# Patient Record
Sex: FEMALE | Race: ASIAN | NOT HISPANIC OR LATINO
[De-identification: names, ages, dates, MRNs, and addresses within clinical notes are randomized per-mention and may not be internally consistent; named-entity substitution may affect disease eponyms.]

---

## 2018-09-04 ENCOUNTER — APPOINTMENT (OUTPATIENT)
Dept: OPHTHALMOLOGY | Facility: CLINIC | Age: 43
End: 2018-09-04
Payer: SELF-PAY

## 2018-09-04 DIAGNOSIS — H40.003 PREGLAUCOMA, UNSPECIFIED, BILATERAL: ICD-10-CM

## 2018-09-04 PROBLEM — Z00.00 ENCOUNTER FOR PREVENTIVE HEALTH EXAMINATION: Status: ACTIVE | Noted: 2018-09-04

## 2018-09-04 PROCEDURE — 99201 OFFICE OUTPATIENT NEW 10 MINUTES: CPT

## 2018-09-04 PROCEDURE — 92133 CPTRZD OPH DX IMG PST SGM ON: CPT

## 2018-09-04 PROCEDURE — 76514 ECHO EXAM OF EYE THICKNESS: CPT

## 2019-03-04 ENCOUNTER — OUTPATIENT (OUTPATIENT)
Dept: OUTPATIENT SERVICES | Facility: HOSPITAL | Age: 44
LOS: 1 days | End: 2019-03-04
Payer: MEDICAID

## 2019-03-04 ENCOUNTER — APPOINTMENT (OUTPATIENT)
Dept: INTERVENTIONAL RADIOLOGY/VASCULAR | Facility: HOSPITAL | Age: 44
End: 2019-03-04

## 2019-03-04 DIAGNOSIS — D25.9 LEIOMYOMA OF UTERUS, UNSPECIFIED: ICD-10-CM

## 2019-03-04 DIAGNOSIS — Z01.818 ENCOUNTER FOR OTHER PREPROCEDURAL EXAMINATION: ICD-10-CM

## 2019-03-04 DIAGNOSIS — Z78.9 OTHER SPECIFIED HEALTH STATUS: ICD-10-CM

## 2019-03-04 DIAGNOSIS — F17.200 NICOTINE DEPENDENCE, UNSPECIFIED, UNCOMPLICATED: ICD-10-CM

## 2019-03-04 LAB
ANION GAP SERPL CALC-SCNC: 10 MMOL/L — SIGNIFICANT CHANGE UP (ref 5–17)
APTT BLD: 27.8 SEC — SIGNIFICANT CHANGE UP (ref 27.5–36.3)
BUN SERPL-MCNC: 15 MG/DL — SIGNIFICANT CHANGE UP (ref 7–23)
CALCIUM SERPL-MCNC: 9.3 MG/DL — SIGNIFICANT CHANGE UP (ref 8.4–10.5)
CHLORIDE SERPL-SCNC: 106 MMOL/L — SIGNIFICANT CHANGE UP (ref 96–108)
CO2 SERPL-SCNC: 23 MMOL/L — SIGNIFICANT CHANGE UP (ref 22–31)
CREAT SERPL-MCNC: 0.7 MG/DL — SIGNIFICANT CHANGE UP (ref 0.5–1.3)
GLUCOSE SERPL-MCNC: 104 MG/DL — HIGH (ref 70–99)
HCG SERPL-ACNC: <.1 MIU/ML — SIGNIFICANT CHANGE UP
HCT VFR BLD CALC: 45.5 % — HIGH (ref 34.5–45)
HGB BLD-MCNC: 14.6 G/DL — SIGNIFICANT CHANGE UP (ref 11.5–15.5)
INR BLD: 0.87 — LOW (ref 0.88–1.16)
MCHC RBC-ENTMCNC: 32.1 GM/DL — SIGNIFICANT CHANGE UP (ref 32–36)
MCHC RBC-ENTMCNC: 33 PG — SIGNIFICANT CHANGE UP (ref 27–34)
MCV RBC AUTO: 102.9 FL — HIGH (ref 80–100)
NRBC # BLD: 0 /100 WBCS — SIGNIFICANT CHANGE UP (ref 0–0)
PLATELET # BLD AUTO: 194 K/UL — SIGNIFICANT CHANGE UP (ref 150–400)
POTASSIUM SERPL-MCNC: 4.8 MMOL/L — SIGNIFICANT CHANGE UP (ref 3.5–5.3)
POTASSIUM SERPL-SCNC: 4.8 MMOL/L — SIGNIFICANT CHANGE UP (ref 3.5–5.3)
PROTHROM AB SERPL-ACNC: 9.8 SEC — LOW (ref 10–12.9)
RBC # BLD: 4.42 M/UL — SIGNIFICANT CHANGE UP (ref 3.8–5.2)
RBC # FLD: 13.6 % — SIGNIFICANT CHANGE UP (ref 10.3–14.5)
SODIUM SERPL-SCNC: 139 MMOL/L — SIGNIFICANT CHANGE UP (ref 135–145)
WBC # BLD: 7.12 K/UL — SIGNIFICANT CHANGE UP (ref 3.8–10.5)
WBC # FLD AUTO: 7.12 K/UL — SIGNIFICANT CHANGE UP (ref 3.8–10.5)

## 2019-03-04 PROCEDURE — 85610 PROTHROMBIN TIME: CPT

## 2019-03-04 PROCEDURE — 80048 BASIC METABOLIC PNL TOTAL CA: CPT

## 2019-03-04 PROCEDURE — 85027 COMPLETE CBC AUTOMATED: CPT

## 2019-03-04 PROCEDURE — 84702 CHORIONIC GONADOTROPIN TEST: CPT

## 2019-03-04 PROCEDURE — 85730 THROMBOPLASTIN TIME PARTIAL: CPT

## 2019-03-04 PROCEDURE — 99204 OFFICE O/P NEW MOD 45 MIN: CPT

## 2019-03-06 VITALS — HEART RATE: 64 BPM | DIASTOLIC BLOOD PRESSURE: 70 MMHG | SYSTOLIC BLOOD PRESSURE: 110 MMHG | RESPIRATION RATE: 14 BRPM

## 2019-03-06 PROBLEM — F17.200 CURRENT SMOKER: Status: ACTIVE | Noted: 2019-03-06

## 2019-03-06 PROBLEM — Z78.9 ALCOHOL USE: Status: ACTIVE | Noted: 2019-03-06

## 2019-03-06 PROBLEM — D25.9 FIBROID UTERUS: Status: ACTIVE | Noted: 2019-03-06

## 2019-03-06 NOTE — HISTORY OF PRESENT ILLNESS
[Dysmenorrhea] : dysmenorrhea [Menorrhagia] : ~T menorrhagia [Pelvic Pain] : pelvic pain [Urinary Frequency] : urinary frequency [Pressure] : pressure [Bloating] : bloating [Myomectomy] : myomectomy [Hysteroscopy] : hysteroscopy [Monthly Cycles Regular] : monthly cycles are regular [G ___] : G [unfilled] [P___] : P [unfilled] [Stable] : stable [FreeTextEntry1] : 43 year old first diagnosed with fibroids in 2008. Menorrhagia primary complaint at the time.  Underwent myomectomy in [place] hysteroscopically.  Additional fibroids have grown since and menorrhagia returned lasting 6 days, 2 heavy with clots, no metrorrhagia.  Also complains of cramps, dyspareunia, urinary frequency , nocturia, pelvic pressure and abdominal distention.  \par Pap negative.\par Periods are regular.\par \par MRI at outside institution January 2019 uterus measures 12.6x10.2x13.4 cm with multiple fibroids the largest measuring 6x4x5.  Ms Henderson has been asked to bring a CD of the MRI to my office for my review.\par No desire for future preganancy [Bleeding In Between Periods] : no bleeding in between periods [Lesions/ Discharge] : no lesions and or discharge [Menopausal Symptoms] : no complaints of menopausal symptoms [Post Menopause] : not post menopausal [Endometriosis] : no endometriosis [PID] : no PID [de-identified] : performed at outside institution.  awaiting images

## 2019-03-06 NOTE — ASSESSMENT
[FreeTextEntry1] : 43 year old with symptomatic uterine fibroids.  Awaiting CD of MRI to determine if patient is candidate for uterine fibroid embolization

## 2019-03-06 NOTE — CONSULT LETTER
[Dear  ___] : Dear  [unfilled], [Consult Letter:] : I had the pleasure of evaluating your patient, [unfilled]. [Please see my note below.] : Please see my note below. [Consult Closing:] : Thank you very much for allowing me to participate in the care of this patient.  If you have any questions, please do not hesitate to contact me. [Sincerely,] : Sincerely, [FreeTextEntry1] : She appears to be a candidate for UAE but awaiting CD of her outside January pelvic MRI. [FreeTextEntry3] : Juan Ramon Gil MD, FSIR\par Chief Interventional Radiology\par [facility]\par [facility]\par

## 2019-03-06 NOTE — PHYSICAL EXAM
[Alert] : alert [No Acute Distress] : no acute distress [Well Nourished] : well nourished [Well Developed] : well developed [Normal Sclera/Conjunctiva] : normal sclera/conjunctiva [EOMI] : extra occular movement intact [No Proptosis] : no proptosis [Normal Oropharynx] : the oropharynx was normal [No Neck Mass] : no neck mass was observed [Supple] : the neck was supple [No Respiratory Distress] : no respiratory distress [No Accessory Muscle Use] : no accessory muscle use [Clear to Auscultation] : lungs were clear to auscultation bilaterally [Normal Rate] : heart rate was normal  [Normal S1, S2] : normal S1 and S2 [Regular Rhythm] : with a regular rhythm [Pedal Pulses Normal] : the pedal pulses are present [No Edema] : there was no peripheral edema [Normal Bowel Sounds] : normal bowel sounds [Not Tender] : non-tender [Soft] : abdomen soft [Normal Post Cervical Nodes] : posterior cervical nodes [Normal Anterior Cervical Nodes] : anterior cervical nodes [Normal Axillary Nodes] : axillary nodes [No CVA Tenderness] : no ~M costovertebral angle tenderness [No Spinal Tenderness] : no spinal tenderness [Spine Straight] : spine straight [Normal Gait] : normal gait [Normal Strength/Tone] : muscle strength and tone were normal [No Rash] : no rash [No Tremors] : no tremors [Oriented x3] : oriented to person, place, and time [Normal Affect] : the affect was normal [Normal Mood] : the mood was normal [Fully active, able to carry on all pre-disease performance without restriction] : Fully active, able to carry on all pre-disease performance without restriction [Acanthosis Nigricans___] : no acanthosis nigricans [de-identified] : Uterus palpable to just below umbilicus

## 2019-03-28 ENCOUNTER — APPOINTMENT (OUTPATIENT)
Dept: INTERVENTIONAL RADIOLOGY/VASCULAR | Facility: HOSPITAL | Age: 44
End: 2019-03-28

## 2019-04-07 ENCOUNTER — FORM ENCOUNTER (OUTPATIENT)
Age: 44
End: 2019-04-07

## 2019-04-08 ENCOUNTER — APPOINTMENT (OUTPATIENT)
Dept: INTERVENTIONAL RADIOLOGY/VASCULAR | Facility: HOSPITAL | Age: 44
End: 2019-04-08
Payer: COMMERCIAL

## 2019-04-08 ENCOUNTER — OUTPATIENT (OUTPATIENT)
Dept: OUTPATIENT SERVICES | Facility: HOSPITAL | Age: 44
LOS: 1 days | End: 2019-04-08
Payer: MEDICAID

## 2019-04-08 PROCEDURE — C1889: CPT

## 2019-04-08 PROCEDURE — 37243 VASC EMBOLIZE/OCCLUDE ORGAN: CPT

## 2019-04-08 PROCEDURE — 36247 INS CATH ABD/L-EXT ART 3RD: CPT | Mod: 59

## 2019-04-08 PROCEDURE — C1887: CPT

## 2019-04-08 PROCEDURE — 36247 INS CATH ABD/L-EXT ART 3RD: CPT

## 2019-04-08 PROCEDURE — C1894: CPT

## 2019-04-08 PROCEDURE — C1769: CPT

## 2019-04-08 RX ORDER — OXYCODONE HYDROCHLORIDE 5 MG/1
2 TABLET ORAL
Qty: 60 | Refills: 0 | OUTPATIENT
Start: 2019-04-08 | End: 2019-04-12

## 2019-04-08 RX ORDER — OXYCODONE HYDROCHLORIDE 5 MG/1
1 TABLET ORAL
Qty: 14 | Refills: 0 | OUTPATIENT
Start: 2019-04-08

## 2019-04-29 ENCOUNTER — APPOINTMENT (OUTPATIENT)
Dept: INTERVENTIONAL RADIOLOGY/VASCULAR | Facility: HOSPITAL | Age: 44
End: 2019-04-29
Payer: MEDICAID

## 2019-04-29 PROCEDURE — 99213 OFFICE O/P EST LOW 20 MIN: CPT

## 2019-04-29 NOTE — HISTORY OF PRESENT ILLNESS
[FreeTextEntry1] : Three weeks since UAE.  Ten days of pain requiring meds, could only take oxycodone and tylenol, due to allergy to ibuprofen.  Normal bowel movements.  One low grade temp, several night sweats.  Now back working out.  No discharge.  Had some bleeding week after UAE, unsure if it was period which she believes it was.  Due for period next week. Has four month follow up MRI

## 2019-08-04 ENCOUNTER — FORM ENCOUNTER (OUTPATIENT)
Age: 44
End: 2019-08-04

## 2019-08-05 ENCOUNTER — APPOINTMENT (OUTPATIENT)
Dept: INTERVENTIONAL RADIOLOGY/VASCULAR | Facility: HOSPITAL | Age: 44
End: 2019-08-05
Payer: COMMERCIAL

## 2019-08-05 ENCOUNTER — APPOINTMENT (OUTPATIENT)
Dept: MRI IMAGING | Facility: HOSPITAL | Age: 44
End: 2019-08-05
Payer: COMMERCIAL

## 2019-08-05 ENCOUNTER — OUTPATIENT (OUTPATIENT)
Dept: OUTPATIENT SERVICES | Facility: HOSPITAL | Age: 44
LOS: 1 days | End: 2019-08-05
Payer: MEDICAID

## 2019-08-05 ENCOUNTER — APPOINTMENT (OUTPATIENT)
Dept: ULTRASOUND IMAGING | Facility: HOSPITAL | Age: 44
End: 2019-08-05
Payer: COMMERCIAL

## 2019-08-05 PROCEDURE — 99213 OFFICE O/P EST LOW 20 MIN: CPT

## 2019-08-05 PROCEDURE — A9585: CPT

## 2019-08-05 PROCEDURE — 72197 MRI PELVIS W/O & W/DYE: CPT

## 2019-08-05 PROCEDURE — 72197 MRI PELVIS W/O & W/DYE: CPT | Mod: 26

## 2019-08-05 NOTE — HISTORY OF PRESENT ILLNESS
[FreeTextEntry1] : 4 months since UAE   Has had all periods which are much lighter.  Only two days, no heavy days, no clots.  Less urinary frequency.  Dyspareunia moderately improved.  Greatly satisfied with outcome of procedure.

## 2022-07-19 PROBLEM — H40.003 GLAUCOMA SUSPECT OF BOTH EYES: Status: ACTIVE | Noted: 2018-09-05
